# Patient Record
Sex: MALE | Race: WHITE | NOT HISPANIC OR LATINO
[De-identification: names, ages, dates, MRNs, and addresses within clinical notes are randomized per-mention and may not be internally consistent; named-entity substitution may affect disease eponyms.]

---

## 2019-05-13 ENCOUNTER — APPOINTMENT (OUTPATIENT)
Dept: CT IMAGING | Facility: IMAGING CENTER | Age: 39
End: 2019-05-13
Payer: COMMERCIAL

## 2019-05-13 ENCOUNTER — OUTPATIENT (OUTPATIENT)
Dept: OUTPATIENT SERVICES | Facility: HOSPITAL | Age: 39
LOS: 1 days | End: 2019-05-13
Payer: COMMERCIAL

## 2019-05-13 DIAGNOSIS — R10.9 UNSPECIFIED ABDOMINAL PAIN: ICD-10-CM

## 2019-05-13 PROCEDURE — 74177 CT ABD & PELVIS W/CONTRAST: CPT | Mod: 26

## 2019-05-13 PROCEDURE — 74177 CT ABD & PELVIS W/CONTRAST: CPT

## 2019-08-22 ENCOUNTER — APPOINTMENT (OUTPATIENT)
Dept: UROLOGY | Facility: CLINIC | Age: 39
End: 2019-08-22
Payer: COMMERCIAL

## 2019-08-22 VITALS
RESPIRATION RATE: 16 BRPM | TEMPERATURE: 98.2 F | SYSTOLIC BLOOD PRESSURE: 128 MMHG | DIASTOLIC BLOOD PRESSURE: 86 MMHG | BODY MASS INDEX: 30.7 KG/M2 | WEIGHT: 260 LBS | HEART RATE: 60 BPM | HEIGHT: 77 IN

## 2019-08-22 PROCEDURE — 99244 OFF/OP CNSLTJ NEW/EST MOD 40: CPT

## 2019-08-22 NOTE — HISTORY OF PRESENT ILLNESS
[FreeTextEntry1] : Patient  is a 39 year old gentleman who seeks consultation for an elective vasectomy. I reviewed the questionnaire he had completed with him in detail. He has  3 children wife. His wife, age 40, was not able to accompany him to the visit today.  He has no known drug allergies.  His past medical history demonstrates [no significant urologic issues.  In his present occupation as a  he has no known toxin exposure.  He does smoke and drinks only socially.  He has no known drug allergies.  His review of systems is non-contributory. His family history is not significant.\par \par

## 2019-08-22 NOTE — ASSESSMENT
[FreeTextEntry1] : We discussed the procedure for the No-Scalpel vasectomy. Risks and benefits were discussed. In particular, he understands that at least 2 semen analyses are required demonstrating no sperm in a spun specimen prior to his ability to have sexual relations without contraception. He also understands that this may take several months to occur as well as the possibility of sperm reappearing in his ejaculate in the future. The slide presentation was reviewed and all his questions were answered. He was given an antibiotic to begin the night prior to the procedure. He will be discussing it with his partner and possibly scheduling the procedure in the near future. I have also requested several baseline blood studies.  Urine dip and microscopic analysis was requested. I will make further recommendations when the results return.\par \par \par

## 2019-08-22 NOTE — PHYSICAL EXAM
[General Appearance - Well Developed] : well developed [General Appearance - Well Nourished] : well nourished [Normal Appearance] : normal appearance [Well Groomed] : well groomed [Heart Rate And Rhythm] : Heart rate and rhythm were normal [General Appearance - In No Acute Distress] : no acute distress [Arterial Pulses Normal] : the pedal pulses were normal [Edema] : no peripheral edema [Respiration, Rhythm And Depth] : normal respiratory rhythm and effort [Exaggerated Use Of Accessory Muscles For Inspiration] : no accessory muscle use [Auscultation Breath Sounds / Voice Sounds] : lungs were clear to auscultation bilaterally [Chest Palpation] : palpation of the chest revealed no abnormalities [Lungs Percussion] : the lungs were normal to percussion [Bowel Sounds] : normal bowel sounds [Abdomen Soft] : soft [Abdomen Tenderness] : non-tender [Abdomen Mass (___ Cm)] : no abdominal mass palpated [Abdomen Hernia] : no hernia was discovered [Costovertebral Angle Tenderness] : no ~M costovertebral angle tenderness [Urinary Bladder Findings] : the bladder was normal on palpation [Urethral Meatus] : meatus normal [Testes Tenderness] : no tenderness of the testes [Scrotum] : the scrotum was normal [Epididymis] : the epididymides were normal [Anus Abnormality] : the anus and perineum were normal [Testes Mass (___cm)] : there were no testicular masses [Prostate Enlargement] : the prostate was not enlarged [Rectal Exam - Rectum] : digital rectal exam was normal [Prostate Tenderness] : the prostate was not tender [No Prostate Nodules] : no prostate nodules [Normal Station and Gait] : the gait and station were normal for the patient's age [Skin Color & Pigmentation] : normal skin color and pigmentation [Skin Turgor] : supple [] : no rash [Skin Lesions] : no skin lesions [No Focal Deficits] : no focal deficits [Sensation] : the sensory exam was normal to light touch and pinprick [Motor Exam] : the motor exam was normal [Oriented To Time, Place, And Person] : oriented to person, place, and time [Affect] : the affect was normal [Mood] : the mood was normal [Not Anxious] : not anxious [Cervical Lymph Nodes Enlarged Posterior Bilaterally] : posterior cervical [No Palpable Adenopathy] : no palpable adenopathy [Supraclavicular Lymph Nodes Enlarged Bilaterally] : supraclavicular [Cervical Lymph Nodes Enlarged Anterior Bilaterally] : anterior cervical [Axillary Lymph Nodes Enlarged Bilaterally] : axillary [Femoral Lymph Nodes Enlarged Bilaterally] : femoral [Inguinal Lymph Nodes Enlarged Bilaterally] : inguinal [Penis Abnormality] : normal circumcised penis

## 2019-08-22 NOTE — LETTER BODY
[FreeTextEntry1] : Dear Dr. Lobo Lundberg,\par \par Thank you for referring your patient Aleksander Pandya for consultation for an elective vasectomy. I have requested several baseline blood studies. I will see the patient back in followup shortly and make further recommendations. I have attached a copy of my consultation note for your records.\par \par Thank you again for this kind referral. I will certainly keep you updated with further progress. Please do not hesitate to call me if you have any questions.\par \par Best regards,\par \par \par \par Ezra Garvin M.D., PhD\par Professor of Urology\par    [facility] [place]\par Director, Reproductive and Sexual Medicine\par    [facility]\par    [facility]\par

## 2019-08-24 LAB
ALBUMIN SERPL ELPH-MCNC: 4.7 G/DL
ALP BLD-CCNC: 49 U/L
ALT SERPL-CCNC: 14 U/L
ANION GAP SERPL CALC-SCNC: 12 MMOL/L
AST SERPL-CCNC: 14 U/L
BASOPHILS # BLD AUTO: 0.07 K/UL
BASOPHILS NFR BLD AUTO: 1.1 %
BILIRUB SERPL-MCNC: 0.6 MG/DL
BUN SERPL-MCNC: 16 MG/DL
CALCIUM SERPL-MCNC: 9.5 MG/DL
CHLORIDE SERPL-SCNC: 101 MMOL/L
CHOLEST SERPL-MCNC: 194 MG/DL
CHOLEST/HDLC SERPL: 2.9 RATIO
CO2 SERPL-SCNC: 27 MMOL/L
CREAT SERPL-MCNC: 1.13 MG/DL
EOSINOPHIL # BLD AUTO: 0.68 K/UL
EOSINOPHIL NFR BLD AUTO: 11.1 %
ESTIMATED AVERAGE GLUCOSE: 100 MG/DL
ESTRADIOL SERPL-MCNC: 30 PG/ML
FSH SERPL-MCNC: 2.8 IU/L
GLUCOSE SERPL-MCNC: 85 MG/DL
HBA1C MFR BLD HPLC: 5.1 %
HCT VFR BLD CALC: 49.2 %
HDLC SERPL-MCNC: 68 MG/DL
HGB BLD-MCNC: 15.5 G/DL
IMM GRANULOCYTES NFR BLD AUTO: 0.3 %
LDLC SERPL CALC-MCNC: 112 MG/DL
LH SERPL-ACNC: 3.8 IU/L
LYMPHOCYTES # BLD AUTO: 1.38 K/UL
LYMPHOCYTES NFR BLD AUTO: 22.5 %
MAN DIFF?: NORMAL
MCHC RBC-ENTMCNC: 29.9 PG
MCHC RBC-ENTMCNC: 31.5 GM/DL
MCV RBC AUTO: 94.8 FL
MONOCYTES # BLD AUTO: 0.37 K/UL
MONOCYTES NFR BLD AUTO: 6 %
NEUTROPHILS # BLD AUTO: 3.62 K/UL
NEUTROPHILS NFR BLD AUTO: 59 %
PLATELET # BLD AUTO: 236 K/UL
POTASSIUM SERPL-SCNC: 4.8 MMOL/L
PROT SERPL-MCNC: 7.2 G/DL
PSA SERPL-MCNC: 0.5 NG/ML
RBC # BLD: 5.19 M/UL
RBC # FLD: 12.3 %
SODIUM SERPL-SCNC: 140 MMOL/L
TRIGL SERPL-MCNC: 71 MG/DL
WBC # FLD AUTO: 6.14 K/UL

## 2019-08-27 LAB
TESTOST BND SERPL-MCNC: 10.9 PG/ML
TESTOST SERPL-MCNC: 494 NG/DL

## 2019-10-10 ENCOUNTER — APPOINTMENT (OUTPATIENT)
Dept: UROLOGY | Facility: CLINIC | Age: 39
End: 2019-10-10

## 2019-11-18 ENCOUNTER — OUTPATIENT (OUTPATIENT)
Dept: OUTPATIENT SERVICES | Facility: HOSPITAL | Age: 39
LOS: 1 days | End: 2019-11-18
Payer: COMMERCIAL

## 2019-11-18 ENCOUNTER — APPOINTMENT (OUTPATIENT)
Dept: UROLOGY | Facility: CLINIC | Age: 39
End: 2019-11-18
Payer: COMMERCIAL

## 2019-11-18 VITALS — DIASTOLIC BLOOD PRESSURE: 89 MMHG | TEMPERATURE: 97.9 F | SYSTOLIC BLOOD PRESSURE: 144 MMHG | HEART RATE: 77 BPM

## 2019-11-18 DIAGNOSIS — R35.0 FREQUENCY OF MICTURITION: ICD-10-CM

## 2019-11-18 PROCEDURE — 55250 REMOVAL OF SPERM DUCT(S): CPT

## 2019-11-26 DIAGNOSIS — Z30.09 ENCOUNTER FOR OTHER GENERAL COUNSELING AND ADVICE ON CONTRACEPTION: ICD-10-CM

## 2019-12-05 ENCOUNTER — APPOINTMENT (OUTPATIENT)
Dept: UROLOGY | Facility: CLINIC | Age: 39
End: 2019-12-05
Payer: COMMERCIAL

## 2019-12-05 PROCEDURE — 99024 POSTOP FOLLOW-UP VISIT: CPT

## 2019-12-05 NOTE — HISTORY OF PRESENT ILLNESS
[FreeTextEntry1] : The patient returns 2 weeks after his vasectomy for followup. He is doing well and has no complaints. \par \par PMH: Patient  is a 39 year old gentleman who seeks consultation for an elective vasectomy. He has  3 children wife. His wife, age 40, was not able to accompany him to the visit today.  He has no known drug allergies.  His past medical history demonstrates [no significant urologic issues.  In his present occupation as a  he has no known toxin exposure.  He does smoke and drinks only socially.  He has no known drug allergies.  His review of systems is non-contributory. His family history is not significant.\par \par

## 2019-12-05 NOTE — ASSESSMENT
[FreeTextEntry1] : The patient returns 2 weeks after his vasectomy for followup. He is doing well and has no complaints. The sites of the vasectomy or palpable bilaterally and nontender. The wound is healing well. He knows to continue to use contraception until two semen analyses demonstrate no sperm on a spun specimen. I will see him back in 3 months in followup. A prescription for a semen analysis was given which he will give one week prior to his return visit here.\par

## 2020-03-10 ENCOUNTER — APPOINTMENT (OUTPATIENT)
Dept: UROLOGY | Facility: CLINIC | Age: 40
End: 2020-03-10

## 2020-03-16 ENCOUNTER — APPOINTMENT (OUTPATIENT)
Dept: UROLOGY | Facility: CLINIC | Age: 40
End: 2020-03-16

## 2020-03-16 NOTE — HISTORY OF PRESENT ILLNESS
[Home] : at home, [unfilled] , at the time of the visit. [Home: ([facility]___] : [place] [Patient & Provider:  (Enter provider's Role) ____] : The patient, [unfilled] and [unfilled] ~ecp~  participated in the telehealth encounter [FreeTextEntry1] : The patient is seen on a telehealth visit today the patient was at home and I was in my office.  We needed to review his recent semen analysis 3 months after his vasectomy and to discuss treatment options.  He is feeling well and he has no complaints.\par \par PMH: Patient was initially seen in consultation for an elective vasectomy. He has  3 children wife. His wife, age 40, was not able to accompany him to the visit today.  He has no known drug allergies.  His past medical history demonstrates [no significant urologic issues.  In his present occupation as a  he has no known toxin exposure.  He does smoke and drinks only socially.  He has no known drug allergies.  His review of systems is non-contributory. His family history is not significant.\par \par

## 2020-03-16 NOTE — PHYSICAL EXAM
[Oriented To Time, Place, And Person] : oriented to person, place, and time [Affect] : the affect was normal [Mood] : the mood was normal [Not Anxious] : not anxious

## 2020-03-16 NOTE — ASSESSMENT
[FreeTextEntry1] : The patient is seen on a telehealth visit today the patient was at home and I was in my office.  We needed to review his recent semen analysis 3 months after his vasectomy and to discuss treatment options.  He is feeling well and he has no complaints.\par \par Semen analysis was done at [facility] and demonstrated no sperm.  However, there was no fructose test performed and his volume was low.  We discussed the implications of this and the need for a second semen analysis with fructose testing.  I have given him several labs that he can go to.  A prescription was written.\par \par Telehealth Consultation: 20 minutes  10 minutes reviewing his history and discussing results.  10 minutes discussing treatment options.\par \par \par \par

## 2020-06-16 ENCOUNTER — OUTPATIENT (OUTPATIENT)
Dept: OUTPATIENT SERVICES | Facility: HOSPITAL | Age: 40
LOS: 1 days | End: 2020-06-16
Payer: COMMERCIAL

## 2020-06-16 ENCOUNTER — APPOINTMENT (OUTPATIENT)
Dept: ANESTHESIOLOGY | Facility: CLINIC | Age: 40
End: 2020-06-16

## 2020-06-16 DIAGNOSIS — M54.16 RADICULOPATHY, LUMBAR REGION: ICD-10-CM

## 2020-06-16 PROCEDURE — 62323 NJX INTERLAMINAR LMBR/SAC: CPT

## 2020-11-21 ENCOUNTER — TRANSCRIPTION ENCOUNTER (OUTPATIENT)
Age: 40
End: 2020-11-21

## 2022-01-12 ENCOUNTER — APPOINTMENT (OUTPATIENT)
Dept: ULTRASOUND IMAGING | Facility: CLINIC | Age: 42
End: 2022-01-12
Payer: COMMERCIAL

## 2022-01-12 ENCOUNTER — APPOINTMENT (OUTPATIENT)
Dept: RADIOLOGY | Facility: CLINIC | Age: 42
End: 2022-01-12
Payer: COMMERCIAL

## 2022-01-12 PROCEDURE — 71100 X-RAY EXAM RIBS UNI 2 VIEWS: CPT | Mod: RT

## 2022-01-12 PROCEDURE — 76700 US EXAM ABDOM COMPLETE: CPT

## 2022-01-14 ENCOUNTER — APPOINTMENT (OUTPATIENT)
Dept: CT IMAGING | Facility: CLINIC | Age: 42
End: 2022-01-14
Payer: COMMERCIAL

## 2022-01-14 PROCEDURE — 74177 CT ABD & PELVIS W/CONTRAST: CPT

## 2022-03-07 ENCOUNTER — OUTPATIENT (OUTPATIENT)
Dept: OUTPATIENT SERVICES | Facility: HOSPITAL | Age: 42
LOS: 1 days | End: 2022-03-07
Payer: COMMERCIAL

## 2022-03-07 ENCOUNTER — APPOINTMENT (OUTPATIENT)
Dept: NUCLEAR MEDICINE | Facility: HOSPITAL | Age: 42
End: 2022-03-07
Payer: COMMERCIAL

## 2022-03-07 DIAGNOSIS — Z00.00 ENCOUNTER FOR GENERAL ADULT MEDICAL EXAMINATION WITHOUT ABNORMAL FINDINGS: ICD-10-CM

## 2022-03-07 DIAGNOSIS — R10.9 UNSPECIFIED ABDOMINAL PAIN: ICD-10-CM

## 2022-03-07 PROCEDURE — A9537: CPT

## 2022-03-07 PROCEDURE — 78227 HEPATOBIL SYST IMAGE W/DRUG: CPT

## 2022-03-07 PROCEDURE — 78227 HEPATOBIL SYST IMAGE W/DRUG: CPT | Mod: 26

## 2022-03-10 ENCOUNTER — NON-APPOINTMENT (OUTPATIENT)
Age: 42
End: 2022-03-10

## 2022-05-02 ENCOUNTER — APPOINTMENT (OUTPATIENT)
Dept: HEPATOLOGY | Facility: CLINIC | Age: 42
End: 2022-05-02
Payer: COMMERCIAL

## 2022-05-02 ENCOUNTER — LABORATORY RESULT (OUTPATIENT)
Age: 42
End: 2022-05-02

## 2022-05-02 VITALS
OXYGEN SATURATION: 98 % | RESPIRATION RATE: 16 BRPM | WEIGHT: 256 LBS | SYSTOLIC BLOOD PRESSURE: 153 MMHG | TEMPERATURE: 97.3 F | HEIGHT: 77 IN | BODY MASS INDEX: 30.23 KG/M2 | HEART RATE: 68 BPM | DIASTOLIC BLOOD PRESSURE: 83 MMHG

## 2022-05-02 DIAGNOSIS — Z30.09 ENCOUNTER FOR OTHER GENERAL COUNSELING AND ADVICE ON CONTRACEPTION: ICD-10-CM

## 2022-05-02 DIAGNOSIS — E80.6 OTHER DISORDERS OF BILIRUBIN METABOLISM: ICD-10-CM

## 2022-05-02 DIAGNOSIS — Z80.0 FAMILY HISTORY OF MALIGNANT NEOPLASM OF DIGESTIVE ORGANS: ICD-10-CM

## 2022-05-02 DIAGNOSIS — K57.30 DIVERTICULOSIS OF LARGE INTESTINE W/OUT PERFORATION OR ABSCESS W/OUT BLEEDING: ICD-10-CM

## 2022-05-02 DIAGNOSIS — G89.29 RIGHT UPPER QUADRANT PAIN: ICD-10-CM

## 2022-05-02 DIAGNOSIS — R10.11 RIGHT UPPER QUADRANT PAIN: ICD-10-CM

## 2022-05-02 DIAGNOSIS — M47.9 SPONDYLOSIS, UNSPECIFIED: ICD-10-CM

## 2022-05-02 DIAGNOSIS — Z87.898 PERSONAL HISTORY OF OTHER SPECIFIED CONDITIONS: ICD-10-CM

## 2022-05-02 DIAGNOSIS — U07.1 COVID-19: ICD-10-CM

## 2022-05-02 DIAGNOSIS — E78.5 HYPERLIPIDEMIA, UNSPECIFIED: ICD-10-CM

## 2022-05-02 DIAGNOSIS — R03.0 ELEVATED BLOOD-PRESSURE READING, W/OUT DIAGNOSIS OF HYPERTENSION: ICD-10-CM

## 2022-05-02 DIAGNOSIS — K64.9 UNSPECIFIED HEMORRHOIDS: ICD-10-CM

## 2022-05-02 PROCEDURE — 99204 OFFICE O/P NEW MOD 45 MIN: CPT

## 2022-05-03 PROBLEM — K64.9 HEMORRHOIDS, UNSPECIFIED HEMORRHOID TYPE: Status: ACTIVE | Noted: 2022-05-03

## 2022-05-03 PROBLEM — K57.30 DIVERTICULOSIS OF COLON: Status: ACTIVE | Noted: 2022-05-03

## 2022-05-03 LAB
AFP-TM SERPL-MCNC: 5.3 NG/ML
ALBUMIN SERPL ELPH-MCNC: 4.8 G/DL
ALP BLD-CCNC: 44 U/L
ALT SERPL-CCNC: 14 U/L
ANION GAP SERPL CALC-SCNC: 12 MMOL/L
AST SERPL-CCNC: 17 U/L
BASOPHILS # BLD AUTO: 0.08 K/UL
BASOPHILS NFR BLD AUTO: 1 %
BILIRUB DIRECT SERPL-MCNC: 0.2 MG/DL
BILIRUB SERPL-MCNC: 0.6 MG/DL
BUN SERPL-MCNC: 19 MG/DL
CALCIUM SERPL-MCNC: 9.5 MG/DL
CERULOPLASMIN SERPL-MCNC: 15 MG/DL
CHLORIDE SERPL-SCNC: 105 MMOL/L
CO2 SERPL-SCNC: 24 MMOL/L
CREAT SERPL-MCNC: 1.17 MG/DL
CRP SERPL-MCNC: <3 MG/L
EGFR: 80 ML/MIN/1.73M2
EOSINOPHIL # BLD AUTO: 0.62 K/UL
EOSINOPHIL NFR BLD AUTO: 7.6 %
ERYTHROCYTE [SEDIMENTATION RATE] IN BLOOD BY WESTERGREN METHOD: 4 MM/HR
ESTIMATED AVERAGE GLUCOSE: 100 MG/DL
FERRITIN SERPL-MCNC: 242 NG/ML
GGT SERPL-CCNC: 10 U/L
GLUCOSE SERPL-MCNC: 90 MG/DL
HBA1C MFR BLD HPLC: 5.1 %
HBV CORE IGG+IGM SER QL: NONREACTIVE
HBV SURFACE AB SERPL IA-ACNC: <3 MIU/ML
HCT VFR BLD CALC: 44.9 %
HEPATITIS A IGG ANTIBODY: NONREACTIVE
HGB BLD-MCNC: 14.6 G/DL
IMM GRANULOCYTES NFR BLD AUTO: 0.4 %
INR PPP: 1.08 RATIO
IRON SATN MFR SERPL: 24 %
IRON SERPL-MCNC: 69 UG/DL
LYMPHOCYTES # BLD AUTO: 1.23 K/UL
LYMPHOCYTES NFR BLD AUTO: 15 %
MAN DIFF?: NORMAL
MCHC RBC-ENTMCNC: 29.9 PG
MCHC RBC-ENTMCNC: 32.5 GM/DL
MCV RBC AUTO: 91.8 FL
MITOCHONDRIA AB SER IF-ACNC: NORMAL
MONOCYTES # BLD AUTO: 0.61 K/UL
MONOCYTES NFR BLD AUTO: 7.5 %
NEUTROPHILS # BLD AUTO: 5.61 K/UL
NEUTROPHILS NFR BLD AUTO: 68.5 %
PLATELET # BLD AUTO: 269 K/UL
POTASSIUM SERPL-SCNC: 4.4 MMOL/L
PROT SERPL-MCNC: 6.7 G/DL
PT BLD: 12.6 SEC
RBC # BLD: 4.89 M/UL
RBC # FLD: 12.5 %
SMOOTH MUSCLE AB SER QL IF: NORMAL
SODIUM SERPL-SCNC: 142 MMOL/L
TIBC SERPL-MCNC: 283 UG/DL
UIBC SERPL-MCNC: 214 UG/DL
WBC # FLD AUTO: 8.18 K/UL

## 2022-05-03 NOTE — HISTORY OF PRESENT ILLNESS
[de-identified] : Mr. Pandya is a very pleasant 42 yo M with BMI in the obese range, a history of elevated BP and cholesterol measurements but without reported diagnoses of hypertension or dyslipidemia yet and not on any prescribed medications currently, history of mild COVID-19 (2022), diverticulosis, and hemorrhoids, who is being seen for a second opinion consultation regarding chronic RUQ abdominal pain and abnormal imaging with hepatosplenomegaly.\par \par PCP: Dr. Kennedi De La Rosa\par GI: Dr. Denisse Rodas\par \par He was referred to me by his mother-in-law, Layne Ferrell, who is also my patient. He said that his gastroenterologist Dr. Rodas also felt it was reasonable to get a second opinion. Mr. Pandya's wife, Shira, participated in today's visit by telephone.\par \par CT abdomen/pelvis with contrast (19): Colonic diverticulosis.\par \par Abdominal US (22): Hepatomegaly (20.3 cm). Splenomegaly (15.1 cm). No ascites.\par \par CT abdomen/pelvis with contrast (22): Mild hepatomegaly. Splenomegaly (15.6 cm). Duplicated left renal collecting system. No ascites.\par \par EGD (22): 2 cm hiatal hernia and mild gastritis. Esophageal biopsies taken to rule out eosinophilic esophagitis (pathology: unremarkable, with no evidence of EoE). Gastric biopsies taken to rule out H pylori (pathology: unremarkable, with no evidence of H pylori or intestinal metaplasia). Duodenal biopsies taken to rule out celiac sprue (pathology: unremarkable, with no evidence of celiac sprue).\par \par NM HIDA scan (3/7/22): Normal quantitative hepatobiliary study. Normal gallbladder ejection fraction of 88%. No scan evidence of biliary dyskinesia or chronic acalculous cholecystitis.\par \par He reports that he was in his usual state of health until near New Years, when he began to have RUQ pain. His wife thinks he began complaining of the pain in late 2021 before New Years, but his recollection is that it started shortly after he developed mild COVID-19 infection in early 2022 a few days after receiving his first COVID-19 vaccination. He was febrile for 1 day but then felt better, apart from worsening RUQ pain. He said that the pain became "intense" and "wasn't going away", prompting him to see his PCP Dr. De La Rosa who did labs and ordered an abdominal sonogram. Because the sonogram showed hepatosplenomegaly, he went to his gastroenterologist Dr. Rodas, who he says did not think that his liver felt enlarged by palpation on exam but ordered a CT scan that also showed mild hepatosplenomegaly. He says that he was told that the findings may be due to his prior COVID-19 infection in January or due to his body habitus.\par \par In terms of his RUQ pain, he says that at times it was "more acute, like something poking in there", but that the severity appears to be decreasing. He is worried that it persists throughout the day, but now describes the pain as "dull, like I got hit there the day before" or sometimes "like a tingling". The pain sometimes radiates down his oblique or towards his right flank. The pain is worse when he is seated or lying flat on his back on a hard surface. It previously hurt while running, but more recently he was able to resume weight lifting and running without exacerbating the pain. He thinks the pain is sometimes worsened after a big meal, especially if he has gas and bloating. It is also sometimes worsened if he becomes constipated, though usually he has regular daily BMs. He has occasional rectal bleeding that is unchanged in recent months and that was previously attributed to diverticulosis or hemorrhoids after he underwent colonoscopy with Dr. Rodas in 2019. He also has been having a rash recently and saw a dermatologist this morning who thought he should start an antihistamine. His primary care doctor's office also recommended Lexapro for anxiety, but he self-discontinued it after 3 days.\par \par He admits that he "drinks more than [he] should" for several years, "but not to the point where it's a problem." His wife says that his drinking increased after his mother's death, but then recently had decreased after he had COVID-19. He says that he had "barely any alcohol" in February, but then gradually resumed drinking once he started feeling better. He denies any history of morning drinking, withdrawal symptoms, or legal or work problems related to alcohol, including no history of DWIs. He typically drinks alcohol 4-5 nights/week, typically "a few drinks after dinner" consisting of beer, bourbon, or vodka. When he drinks beer, he has 3-5 beers/night. He does not measure out his liquor if drinking bourbon or vodka, but he and his wife estimate that he has to buy a new bottle of bourbon monthly and a new bottle of vodka monthly and he is the only one drinking both.\par \par He is worried about his risk of pancreatic cancer given his family history. He has a strong family history of pancreatic cancer (grandmother and mother both  in their 60s and maternal uncle  in his 60s). He has no known family history of liver disease. He says that he thinks his mother had genetic testing as part of a clinical trial before her death and that the testing did not identify a heritable cause of her cancer. His father had colon cancer.\par \par He used to use chewing tobacco in his teens and 20s, but not since then. He does not smoke cigarettes, but rarely smokes cigars. No recreational drug use. He works as a  in the same school that he once attended. He is  with 3 children.\par \par Labs (22): HBsAg negative, HCV Ab negative, EBV serologies consistent with prior exposure\par \par Labs (22):\par CBC: WBC 6.8, Hb 15.0, HCT 44.8, Plt 262\par BMP unremarkable except K 6.2 (high), Gluc 64 (low), with BUN 15 and Cr 1.06\par Liver panel: ALP 51, AST 18, ALT 17, TB 1.3 (high), TP 7.3, Alb 4.9\par Lipid panel: Cholesterol 217 (high), HDL 63,  (high), Tg 85\par \par Labs (22): K 4.3

## 2022-05-03 NOTE — CONSULT LETTER
[Dear  ___] : Dear  [unfilled], [Consult Letter:] : I had the pleasure of evaluating your patient, [unfilled]. [Please see my note below.] : Please see my note below. [Consult Closing:] : Thank you very much for allowing me to participate in the care of this patient.  If you have any questions, please do not hesitate to contact me. [FreeTextEntry2] : Dr. Kennedi De La Rosa [FreeTextEntry3] : Sincerely,\par \par Tricia Colindres M.D., Ph.D.\par Director, Women's Liver Health Program, [facility]\par Transplant Hepatology, [facility]\par  of Medicine\par [employer] [facility]\par [address]\[place] [address]\par Tel: [phone]\par Fax: [phone]\par Cell: [phone]\par E-mail: [email or web address]

## 2022-05-03 NOTE — ASSESSMENT
[FreeTextEntry1] : # Hepatosplenomegaly:\par - Not palpable on exam, but seen on recent US and CT imaging in 1/2022 which was personally reviewed today.\par - We discussed that development of hepatosplenomegaly is not typical in association with mild COVID-19, and that therefore further evaluation is warranted to rule out chronic liver disease and portal hypertension despite his normal liver enzymes. Recent labs (2/2022) also with mild hyperbilirubinemia but with normal albumin level. INR was not yet checked. Bilirubin fractionation was not performed.\par - He has risk factors for steatohepatitis including risky alcohol consumption, BMI in the obese range though without significant central adiposity on exam, dyslipidemia, and likely hypertension (not yet on medication).\par - MR elastography ordered for quantification of hepatic steatosis and for non-invasive staging of fibrosis.\par - We discussed that he may eventually need liver biopsy if diagnosis and staging remains unclear after labs and MR elastography.\par \par # Risky alcohol consumption: Mr. MACHUCA was counseled to: consume alcohol only in moderation (<=2 standard drinks/day and <=14 standard drinks/week for men). We discussed the potential harms of risky alcohol consumption including but not limited to alcohol-associated hepatitis, cirrhosis, and hepatocellular carcinoma (HCC). I advised him to abstain altogether from alcohol until after his MR elastography given concern for possible chronic liver disease and portal hypertension.\par \par # Chronic RUQ pain:\par - Exam not suggestive of pain related to hepatomegaly and Robbi capsule stretch given absence of tenderness.\par - His description of the pain and its localized nature and provocative factors is more suggestive of cutaneous nerve entrapment or muscle strain than a visceral etiology, especially given overall reassuring US, CT, and EGD apart from the hepatosplenomegaly. I suggested that he consider returning to his prior pain management specialist with whom he had prior intervention for degenerative lumbar spine disease, to see whether he may be a candidate for trigger point injection to alleviate his symptoms.\par \par # Family history of pancreatic and colon cancer:\par - He underwent colonoscopy with Dr. Rodas in 2019 with findings of diverticulosis and hemorrhoids.\par - Will refer today to [facility] for consultation as he may have heritable syndrome that could require additional specialized cancer screening/surveillance. He is interested in  consultation.\par \par Next follow-up: 1 month

## 2022-05-05 LAB — ANA SER IF-ACNC: NEGATIVE

## 2022-05-09 LAB
A1AT PHENOTYP SERPL-IMP: NORMAL
A1AT SERPL-MCNC: 111 MG/DL
PHOSPHATIDYETHANOL (PETH), WHOLE BLOOD: 193 NG/ML

## 2022-06-02 ENCOUNTER — APPOINTMENT (OUTPATIENT)
Dept: MRI IMAGING | Facility: CLINIC | Age: 42
End: 2022-06-02
Payer: COMMERCIAL

## 2022-06-02 ENCOUNTER — OUTPATIENT (OUTPATIENT)
Dept: OUTPATIENT SERVICES | Facility: HOSPITAL | Age: 42
LOS: 1 days | End: 2022-06-02
Payer: COMMERCIAL

## 2022-06-02 ENCOUNTER — RESULT REVIEW (OUTPATIENT)
Age: 42
End: 2022-06-02

## 2022-06-02 DIAGNOSIS — Z00.8 ENCOUNTER FOR OTHER GENERAL EXAMINATION: ICD-10-CM

## 2022-06-02 DIAGNOSIS — E80.6 OTHER DISORDERS OF BILIRUBIN METABOLISM: ICD-10-CM

## 2022-06-02 PROCEDURE — 76391 MR ELASTOGRAPHY: CPT | Mod: 26

## 2022-06-02 PROCEDURE — 76391 MR ELASTOGRAPHY: CPT

## 2022-06-02 PROCEDURE — 74183 MRI ABD W/O CNTR FLWD CNTR: CPT | Mod: 26

## 2022-06-02 PROCEDURE — A9585: CPT

## 2022-06-02 PROCEDURE — 74183 MRI ABD W/O CNTR FLWD CNTR: CPT

## 2022-06-03 ENCOUNTER — NON-APPOINTMENT (OUTPATIENT)
Age: 42
End: 2022-06-03

## 2022-06-03 ENCOUNTER — APPOINTMENT (OUTPATIENT)
Dept: HEPATOLOGY | Facility: CLINIC | Age: 42
End: 2022-06-03

## 2022-06-23 DIAGNOSIS — R16.0 HEPATOMEGALY, NOT ELSEWHERE CLASSIFIED: ICD-10-CM

## 2022-06-23 DIAGNOSIS — R16.1 SPLENOMEGALY, NOT ELSEWHERE CLASSIFIED: ICD-10-CM

## 2022-06-28 ENCOUNTER — APPOINTMENT (OUTPATIENT)
Dept: HEPATOLOGY | Facility: CLINIC | Age: 42
End: 2022-06-28

## 2022-12-23 ENCOUNTER — NON-APPOINTMENT (OUTPATIENT)
Age: 42
End: 2022-12-23

## 2023-01-02 ENCOUNTER — NON-APPOINTMENT (OUTPATIENT)
Age: 43
End: 2023-01-02